# Patient Record
Sex: MALE | Race: OTHER | HISPANIC OR LATINO | URBAN - METROPOLITAN AREA
[De-identification: names, ages, dates, MRNs, and addresses within clinical notes are randomized per-mention and may not be internally consistent; named-entity substitution may affect disease eponyms.]

---

## 2022-10-12 ENCOUNTER — INPATIENT (INPATIENT)
Facility: HOSPITAL | Age: 22
LOS: 4 days | Discharge: HOME | End: 2022-10-17
Attending: PSYCHIATRY & NEUROLOGY | Admitting: PSYCHIATRY & NEUROLOGY
Payer: COMMERCIAL

## 2022-10-12 VITALS
DIASTOLIC BLOOD PRESSURE: 57 MMHG | RESPIRATION RATE: 18 BRPM | HEART RATE: 75 BPM | OXYGEN SATURATION: 100 % | WEIGHT: 199.96 LBS | TEMPERATURE: 98 F | SYSTOLIC BLOOD PRESSURE: 137 MMHG

## 2022-10-12 DIAGNOSIS — F32.A DEPRESSION, UNSPECIFIED: ICD-10-CM

## 2022-10-12 LAB
AMPHET UR-MCNC: SIGNIFICANT CHANGE UP
ANION GAP SERPL CALC-SCNC: 9 MMOL/L — SIGNIFICANT CHANGE UP (ref 7–14)
APAP SERPL-MCNC: <5 UG/ML — LOW (ref 10–30)
APPEARANCE UR: CLEAR — SIGNIFICANT CHANGE UP
BARBITURATES UR SCN-MCNC: SIGNIFICANT CHANGE UP
BASOPHILS # BLD AUTO: 0.05 K/UL — SIGNIFICANT CHANGE UP (ref 0–0.2)
BASOPHILS NFR BLD AUTO: 0.6 % — SIGNIFICANT CHANGE UP (ref 0–1)
BENZODIAZ UR-MCNC: SIGNIFICANT CHANGE UP
BILIRUB UR-MCNC: NEGATIVE — SIGNIFICANT CHANGE UP
BUN SERPL-MCNC: 13 MG/DL — SIGNIFICANT CHANGE UP (ref 10–20)
CALCIUM SERPL-MCNC: 9.5 MG/DL — SIGNIFICANT CHANGE UP (ref 8.4–10.5)
CHLORIDE SERPL-SCNC: 103 MMOL/L — SIGNIFICANT CHANGE UP (ref 98–110)
CO2 SERPL-SCNC: 25 MMOL/L — SIGNIFICANT CHANGE UP (ref 17–32)
COCAINE METAB.OTHER UR-MCNC: SIGNIFICANT CHANGE UP
COLOR SPEC: YELLOW — SIGNIFICANT CHANGE UP
CREAT SERPL-MCNC: 0.8 MG/DL — SIGNIFICANT CHANGE UP (ref 0.7–1.5)
DIFF PNL FLD: NEGATIVE — SIGNIFICANT CHANGE UP
DRUG SCREEN 1, URINE RESULT: SIGNIFICANT CHANGE UP
EGFR: 130 ML/MIN/1.73M2 — SIGNIFICANT CHANGE UP
EOSINOPHIL # BLD AUTO: 0.1 K/UL — SIGNIFICANT CHANGE UP (ref 0–0.7)
EOSINOPHIL NFR BLD AUTO: 1.2 % — SIGNIFICANT CHANGE UP (ref 0–8)
ETHANOL SERPL-MCNC: <10 MG/DL — SIGNIFICANT CHANGE UP
FENTANYL UR QL: SIGNIFICANT CHANGE UP
GLUCOSE SERPL-MCNC: 112 MG/DL — HIGH (ref 70–99)
GLUCOSE UR QL: NEGATIVE MG/DL — SIGNIFICANT CHANGE UP
HCT VFR BLD CALC: 43.4 % — SIGNIFICANT CHANGE UP (ref 42–52)
HGB BLD-MCNC: 14.4 G/DL — SIGNIFICANT CHANGE UP (ref 14–18)
IMM GRANULOCYTES NFR BLD AUTO: 0.2 % — SIGNIFICANT CHANGE UP (ref 0.1–0.3)
KETONES UR-MCNC: NEGATIVE — SIGNIFICANT CHANGE UP
LEUKOCYTE ESTERASE UR-ACNC: NEGATIVE — SIGNIFICANT CHANGE UP
LYMPHOCYTES # BLD AUTO: 3.14 K/UL — SIGNIFICANT CHANGE UP (ref 1.2–3.4)
LYMPHOCYTES # BLD AUTO: 38.8 % — SIGNIFICANT CHANGE UP (ref 20.5–51.1)
MCHC RBC-ENTMCNC: 25.7 PG — LOW (ref 27–31)
MCHC RBC-ENTMCNC: 33.2 G/DL — SIGNIFICANT CHANGE UP (ref 32–37)
MCV RBC AUTO: 77.5 FL — LOW (ref 80–94)
METHADONE UR-MCNC: SIGNIFICANT CHANGE UP
MONOCYTES # BLD AUTO: 0.49 K/UL — SIGNIFICANT CHANGE UP (ref 0.1–0.6)
MONOCYTES NFR BLD AUTO: 6 % — SIGNIFICANT CHANGE UP (ref 1.7–9.3)
NEUTROPHILS # BLD AUTO: 4.3 K/UL — SIGNIFICANT CHANGE UP (ref 1.4–6.5)
NEUTROPHILS NFR BLD AUTO: 53.2 % — SIGNIFICANT CHANGE UP (ref 42.2–75.2)
NITRITE UR-MCNC: NEGATIVE — SIGNIFICANT CHANGE UP
NRBC # BLD: 0 /100 WBCS — SIGNIFICANT CHANGE UP (ref 0–0)
OPIATES UR-MCNC: SIGNIFICANT CHANGE UP
OXYCODONE UR-MCNC: SIGNIFICANT CHANGE UP
PCP UR-MCNC: SIGNIFICANT CHANGE UP
PH UR: 7.5 — SIGNIFICANT CHANGE UP (ref 5–8)
PLATELET # BLD AUTO: 273 K/UL — SIGNIFICANT CHANGE UP (ref 130–400)
POTASSIUM SERPL-MCNC: 4 MMOL/L — SIGNIFICANT CHANGE UP (ref 3.5–5)
POTASSIUM SERPL-SCNC: 4 MMOL/L — SIGNIFICANT CHANGE UP (ref 3.5–5)
PROPOXYPHENE QUALITATIVE URINE RESULT: SIGNIFICANT CHANGE UP
PROT UR-MCNC: NEGATIVE MG/DL — SIGNIFICANT CHANGE UP
RBC # BLD: 5.6 M/UL — SIGNIFICANT CHANGE UP (ref 4.7–6.1)
RBC # FLD: 12.7 % — SIGNIFICANT CHANGE UP (ref 11.5–14.5)
SALICYLATES SERPL-MCNC: <0.3 MG/DL — LOW (ref 4–30)
SARS-COV-2 RNA SPEC QL NAA+PROBE: SIGNIFICANT CHANGE UP
SODIUM SERPL-SCNC: 137 MMOL/L — SIGNIFICANT CHANGE UP (ref 135–146)
SP GR SPEC: 1.01 — SIGNIFICANT CHANGE UP (ref 1.01–1.03)
THC UR QL: SIGNIFICANT CHANGE UP
UROBILINOGEN FLD QL: 0.2 MG/DL — SIGNIFICANT CHANGE UP
WBC # BLD: 8.1 K/UL — SIGNIFICANT CHANGE UP (ref 4.8–10.8)
WBC # FLD AUTO: 8.1 K/UL — SIGNIFICANT CHANGE UP (ref 4.8–10.8)

## 2022-10-12 PROCEDURE — 93010 ELECTROCARDIOGRAM REPORT: CPT

## 2022-10-12 PROCEDURE — 99285 EMERGENCY DEPT VISIT HI MDM: CPT

## 2022-10-12 PROCEDURE — 90792 PSYCH DIAG EVAL W/MED SRVCS: CPT | Mod: 95

## 2022-10-12 NOTE — ED ADULT TRIAGE NOTE - CHIEF COMPLAINT QUOTE
pt states he has been feeling suicidal for "quite some time' states he has no active plan of suicide, 1-1 initiated.

## 2022-10-12 NOTE — ED PROVIDER NOTE - CLINICAL SUMMARY MEDICAL DECISION MAKING FREE TEXT BOX
Patient medically cleared for psych evaluation.  Patient seen by telemetry psychiatrist.  Patient to be admitted under voluntary status

## 2022-10-12 NOTE — ED PROVIDER NOTE - EKG ADDITIONAL QUESTION - PERFORMED INDEPENDENT VISUALIZATION
[No Acute Distress] : no acute distress [Well Nourished] : well nourished [Well Developed] : well developed [Well-Appearing] : well-appearing [PERRL] : pupils equal round and reactive to light [Normal Sclera/Conjunctiva] : normal sclera/conjunctiva [EOMI] : extraocular movements intact [No JVD] : no jugular venous distention Yes [No Lymphadenopathy] : no lymphadenopathy [Supple] : supple [Thyroid Normal, No Nodules] : the thyroid was normal and there were no nodules present [No Respiratory Distress] : no respiratory distress  [No Accessory Muscle Use] : no accessory muscle use [Clear to Auscultation] : lungs were clear to auscultation bilaterally [Normal Rate] : normal rate  [Regular Rhythm] : with a regular rhythm [Normal S1, S2] : normal S1 and S2 [No Murmur] : no murmur heard [No Carotid Bruits] : no carotid bruits [No Abdominal Bruit] : a ~M bruit was not heard ~T in the abdomen [No Varicosities] : no varicosities [Pedal Pulses Present] : the pedal pulses are present [No Edema] : there was no peripheral edema [No Palpable Aorta] : no palpable aorta [Soft] : abdomen soft [No Extremity Clubbing/Cyanosis] : no extremity clubbing/cyanosis [Non Tender] : non-tender [Non-distended] : non-distended [No HSM] : no HSM [No Masses] : no abdominal mass palpated [Normal Bowel Sounds] : normal bowel sounds [Normal Anterior Cervical Nodes] : no anterior cervical lymphadenopathy [No CVA Tenderness] : no CVA  tenderness [No Spinal Tenderness] : no spinal tenderness [No Joint Swelling] : no joint swelling [No Rash] : no rash [Coordination Grossly Intact] : coordination grossly intact [No Focal Deficits] : no focal deficits [Deep Tendon Reflexes (DTR)] : deep tendon reflexes were 2+ and symmetric [Normal Affect] : the affect was normal [Normal Insight/Judgement] : insight and judgment were intact

## 2022-10-12 NOTE — ED BEHAVIORAL HEALTH NOTE - BEHAVIORAL HEALTH NOTE
===================  PRE-HOSPITAL COURSE  ===================  SOURCE: ED RN and secondhand documentation   DETAILS:  Patient self-presented to ED c/o suicidal ideation, without plan.      ============  ED COURSE   ============  SOURCE: ED RN and secondhand documentation  BELONGINGS:  No belongings of note. All belongings were provided to hospital security, and patient currently in a gown with a 1:1 staff member.  BEHAVIOR: Patient is AAOx4, calm and cooperative, presenting with depressed mood. Patient endorsing SI without plan. Denies HI/AH/VH. Patient remains in good behavioral and impulse control, is not currently violent/aggressive. Patient well groomed.   TREATMENT: Patient did not require PRNs in ED.    VISITORS: None at bedside.

## 2022-10-12 NOTE — ED BEHAVIORAL HEALTH ASSESSMENT NOTE - SUMMARY
The patient is a 22-year-old male; domiciled with parents; high school student; PPHx of ADD, no prior admission, hx of NSSIB by cutting, denies hx of SA, hx of aggression; PMHx of scoliosis; BIBS accompanied by father; psychiatry consulted for SI.  Pt reports worsening depressive symptoms and SI recently.  He is not engaged in outpatient tx.  Pt would benefit from inpatient admission for safety and stabilization.
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
## 2022-10-12 NOTE — ED PROVIDER NOTE - OBJECTIVE STATEMENT
Patient is a 22-year-old male past medical history of depression presents for evaluation of worsening depressive symptoms happening over the past several weeks to months however noted the abrupt decline in the past several weeks to the point where he is having suicidal ideations stating came to kill himself no plan he denies any homicidal ideations denies any hallucinations or delusional behavior denies any headache visual changes chest pain shortness of breath abdominal pain back pain dysuria hesitancy or frequency denies any vomiting or diarrhea

## 2022-10-12 NOTE — ED BEHAVIORAL HEALTH ASSESSMENT NOTE - AFFECT RANGE
Vaccine Information Statement(s) was given today. This has been reviewed, questions answered, and verbal consent given by Parent for injection(s) and administration of Influenza (Inactivated).    Patient tolerated without incident. See immunization grid for documentation.     Constricted

## 2022-10-12 NOTE — ED PROVIDER NOTE - NS ED ROS FT
Review of Systems:  	•	CONSTITUTIONAL - no fever, no diaphoresis, no chills  	•	SKIN - no rash  	•	HEMATOLOGIC - no bleeding, no bruising  	•	EYES - no eye pain, no blurry vision  	•	ENT - no change in hearing, no sore throat, no ear pain or tinnitus  	•	RESPIRATORY - no shortness of breath, no cough  	•	CARDIAC - no chest pain, no palpitations  	•	GI - no abd pain, no nausea, no vomiting, no diarrhea, no constipation  	•	GENITO-URINARY - no discharge, no dysuria; no hematuria, no increased urinary frequency  	•	MUSCULOSKELETAL - no joint paint, no swelling, no redness  	•	NEUROLOGIC - no weakness, no headache, no paresthesias, no LOC  	•	PSYCH -  anxiety, non suicidal, non homicidal, no hallucination, depression
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
## 2022-10-12 NOTE — ED PROVIDER NOTE - NS ED ATTENDING STATEMENT MOD
This was a shared visit with the SARAY. I reviewed and verified the documentation and independently performed the documented:

## 2022-10-12 NOTE — ED PROVIDER NOTE - ATTENDING APP SHARED VISIT CONTRIBUTION OF CARE
I was present for and supervised the key and critical aspects of the procedures performed during the care of the patient. Patient is a 22-year-old male past medical history of depression presents for evaluation of worsening depressive symptoms happening over the past several weeks to months however noted the abrupt decline in the past several weeks to the point where he is having suicidal ideations stating came to kill himself no plan he denies any homicidal ideations denies any hallucinations or delusional behavior denies any headache visual changes chest pain shortness of breath abdominal pain back pain dysuria hesitancy or frequency denies any vomiting or diarrhea    On physical exam patient is well-appearing on cephalic atraumatic pupils equal round react light commendation extraocular muscles intact oropharynx clear chest clear to auscultation bilaterally abdomen soft nontender nondistended bowel sounds positive extremities full range of motion patient able to ambulate    Assessment plan patient presents for evaluation of worsening depression with suicidal ideations retained labs as well as EKG and will consult telepsychiatry for further evaluation

## 2022-10-12 NOTE — ED PROVIDER NOTE - PHYSICAL EXAMINATION
patient is well-appearing on cephalic atraumatic pupils equal round react light commendation extraocular muscles intact oropharynx clear chest clear to auscultation bilaterally abdomen soft nontender nondistended bowel sounds positive extremities full range of motion patient able to ambulate
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
## 2022-10-13 DIAGNOSIS — F32.1 MAJOR DEPRESSIVE DISORDER, SINGLE EPISODE, MODERATE: ICD-10-CM

## 2022-10-13 DIAGNOSIS — F12.10 CANNABIS ABUSE, UNCOMPLICATED: ICD-10-CM

## 2022-10-13 LAB
A1C WITH ESTIMATED AVERAGE GLUCOSE RESULT: 5.4 % — SIGNIFICANT CHANGE UP (ref 4–5.6)
CHOLEST SERPL-MCNC: 177 MG/DL — SIGNIFICANT CHANGE UP
ESTIMATED AVERAGE GLUCOSE: 108 MG/DL — SIGNIFICANT CHANGE UP (ref 68–114)
HDLC SERPL-MCNC: 46 MG/DL — SIGNIFICANT CHANGE UP
LIPID PNL WITH DIRECT LDL SERPL: 117 MG/DL — HIGH
NON HDL CHOLESTEROL: 131 MG/DL — HIGH
TRIGL SERPL-MCNC: 69 MG/DL — SIGNIFICANT CHANGE UP
TSH SERPL-MCNC: 2.51 UIU/ML — SIGNIFICANT CHANGE UP (ref 0.27–4.2)

## 2022-10-13 PROCEDURE — 99232 SBSQ HOSP IP/OBS MODERATE 35: CPT

## 2022-10-13 RX ORDER — INFLUENZA VIRUS VACCINE 15; 15; 15; 15 UG/.5ML; UG/.5ML; UG/.5ML; UG/.5ML
0.5 SUSPENSION INTRAMUSCULAR ONCE
Refills: 0 | Status: DISCONTINUED | OUTPATIENT
Start: 2022-10-13 | End: 2022-10-17

## 2022-10-13 RX ORDER — DIPHENHYDRAMINE HCL 50 MG
25 CAPSULE ORAL AT BEDTIME
Refills: 0 | Status: DISCONTINUED | OUTPATIENT
Start: 2022-10-13 | End: 2022-10-13

## 2022-10-13 RX ORDER — HYDROXYZINE HCL 10 MG
50 TABLET ORAL EVERY 6 HOURS
Refills: 0 | Status: DISCONTINUED | OUTPATIENT
Start: 2022-10-13 | End: 2022-10-17

## 2022-10-13 RX ORDER — ESCITALOPRAM OXALATE 10 MG/1
10 TABLET, FILM COATED ORAL DAILY
Refills: 0 | Status: DISCONTINUED | OUTPATIENT
Start: 2022-10-13 | End: 2022-10-17

## 2022-10-13 RX ORDER — NICOTINE POLACRILEX 2 MG
2 GUM BUCCAL
Refills: 0 | Status: DISCONTINUED | OUTPATIENT
Start: 2022-10-13 | End: 2022-10-17

## 2022-10-13 RX ORDER — NICOTINE POLACRILEX 2 MG
1 GUM BUCCAL DAILY
Refills: 0 | Status: DISCONTINUED | OUTPATIENT
Start: 2022-10-13 | End: 2022-10-13

## 2022-10-13 RX ORDER — ACETAMINOPHEN 500 MG
650 TABLET ORAL EVERY 6 HOURS
Refills: 0 | Status: DISCONTINUED | OUTPATIENT
Start: 2022-10-13 | End: 2022-10-17

## 2022-10-13 RX ORDER — HALOPERIDOL DECANOATE 100 MG/ML
5 INJECTION INTRAMUSCULAR EVERY 8 HOURS
Refills: 0 | Status: DISCONTINUED | OUTPATIENT
Start: 2022-10-13 | End: 2022-10-17

## 2022-10-13 RX ORDER — NICOTINE POLACRILEX 2 MG
1 GUM BUCCAL DAILY
Refills: 0 | Status: DISCONTINUED | OUTPATIENT
Start: 2022-10-13 | End: 2022-10-17

## 2022-10-13 RX ADMIN — ESCITALOPRAM OXALATE 10 MILLIGRAM(S): 10 TABLET, FILM COATED ORAL at 14:29
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
## 2022-10-13 NOTE — PATIENT PROFILE BEHAVIORAL HEALTH - FALL HARM RISK - UNIVERSAL INTERVENTIONS
Bed in lowest position, wheels locked, appropriate side rails in place/Call bell, personal items and telephone in reach/Instruct patient to call for assistance before getting out of bed or chair/Non-slip footwear when patient is out of bed/[place] to call system/Physically safe environment - no spills, clutter or unnecessary equipment/Purposeful Proactive Rounding/Room/bathroom lighting operational, light cord in reach

## 2022-10-13 NOTE — PROGRESS NOTE BEHAVIORAL HEALTH - NSBHFUPSTRENGTHS_PSY_A_CORE
Motivated and ready for change/Has supportive interpersonal relationships with family, friends or peers

## 2022-10-13 NOTE — PROGRESS NOTE BEHAVIORAL HEALTH - NSBHADDHXSUBSTFT_PSY_A_CORE
Pt states he smokes weed and has transitioned from smoking cigarettes to using vapes. Pt states from 6/22-10/22, he used acid, which caused him to be anxious and hallucinate. Denies current use of illicit drugs. Pt endorses daily cannabis use, unable to quantify; nicotine use via vaping. Denied alcohol use. Denied current use of other illicit substances. Reports h/o acid use last year from 6/22-10/22.

## 2022-10-13 NOTE — PROGRESS NOTE BEHAVIORAL HEALTH - NSBHADMITMEDEDUDETAILS_A_CORE FT
educate don risk and benefits of medication, and side effect profile verbalized understanding educated on risk and benefits of Lexapro, and side effect profile, verbalized understanding Educated on risks and benefits of Lexapro, and side effects profile. Pt verbalized understanding
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
## 2022-10-13 NOTE — H&P ADULT - NSHPLABSRESULTS_GEN_ALL_CORE
14.4   8.10  )-----------( 273      ( 12 Oct 2022 11:03 )             43.4     10-12    137  |  103  |  13  ----------------------------<  112<H>  4.0   |  25  |  0.8    Ca    9.5      12 Oct 2022 11:03            Urinalysis Basic - ( 12 Oct 2022 13:15 )    Color: Yellow / Appearance: Clear / S.015 / pH: x  Gluc: x / Ketone: Negative  / Bili: Negative / Urobili: 0.2 mg/dL   Blood: x / Protein: Negative mg/dL / Nitrite: Negative   Leuk Esterase: Negative / RBC: x / WBC x   Sq Epi: x / Non Sq Epi: x / Bacteria: x

## 2022-10-13 NOTE — PROGRESS NOTE BEHAVIORAL HEALTH - SUMMARY
# 22-year-old male past medical history of depression presents for evaluation of worsening depressive symptoms happening over the past several weeks to months however noted the abrupt decline in the past several weeks to the point where he is having suicidal ideations but no plan. Denies any homicidal ideations denies any hallucinations or delusional behavior denies any headache visual changes chest pain shortness of breath abdominal pain back pain dysuria hesitancy or frequency denies any vomiting or diarrhea. At the time of evaluation, pt denies SI.    On evaluation today, pt presents dysphoric in room. He reports his "suicidal thoughts" is what led him to the hospital. Pt is isolative to room and within behavioral control. Groups/DBT encouraged.   # 22-year-old male past medical history of depression presents for evaluation of worsening depressive symptoms happening over the past several weeks to months however noted the abrupt decline in the past several weeks to the point where he is having suicidal ideations but no plan. Denies any homicidal ideations denies any hallucinations or delusional behavior denies any headache visual changes chest pain shortness of breath abdominal pain back pain dysuria hesitancy or frequency denies any vomiting or diarrhea. At the time of evaluation, pt denies SI.    On evaluation today, pt presents dysphoric in room. He reports his "suicidal thoughts" is what led him to the hospital. Pt is isolative to room and within behavioral control. Groups/DBT encouraged. Pt in agreement of starting Lexapro to help with mood.   #MDD  - start Lexapro 10mg PO 1x daily qac --> (10/13) 22-year-old male past medical history of depression presents for evaluation of worsening depressive symptoms happening over the past several weeks to months however noted the abrupt decline in the past several weeks to the point where he is having suicidal ideations but no plan. Denies any homicidal ideations denies any hallucinations or delusional behavior denies any headache visual changes chest pain shortness of breath abdominal pain back pain dysuria hesitancy or frequency denies any vomiting or diarrhea. At the time of evaluation, pt denies SI.    On evaluation today, pt presents dysphoric in room. He reports his "suicidal thoughts" is what led him to the hospital. Pt is isolative to room and within behavioral control. Groups/DBT encouraged. Pt in agreement of starting Lexapro to help with mood.     #MDD  - start Lexapro 10mg PO daily (10/13)  -start hydroxyzine 25 mg q6h prn for anxiety/insomnia  -encourage groups/DBT    #Cannabis use d/o  -no treatment protocol indicated at this time    #Agitation  -for agitation not amenable to verbal redirection, may give haldol 5 mg q6h prn and ativan 2 mg q6h prn with escalation to IM if pt is a danger to self or/and others with repeat EKG to ensure QTc <500 ms 22-year-old male; domiciled with parents; high school student; PPHx of ?ADD, no prior admission, hx of NSSIB by cutting, denies hx of SA, hx of aggression; PMHx of scoliosis; BIBS accompanied by father; psychiatry consulted for SI. Pt reports worsening depressive symptoms and SI in setting of multiple psychosocial stressors. His presentation appears most consistent with MDD at this time with underlying cannabis use.     #MDD  -start Lexapro 10mg PO daily (10/13)  -start hydroxyzine 25 mg q6h prn for anxiety/insomnia  -encourage groups/DBT    #Cannabis use d/o  -no treatment protocol indicated at this time    #Agitation  -for agitation not amenable to verbal redirection, may give haldol 5 mg q6h prn and ativan 2 mg q6h prn with escalation to IM if pt is a danger to self or/and others with repeat EKG to ensure QTc <500 ms

## 2022-10-13 NOTE — PROGRESS NOTE BEHAVIORAL HEALTH - NSBHCHARTREVIEWLAB_PSY_A_CORE FT
Cholesterol, Serum: 177 mg/dL (10.13.22 @ 08:59)Triglycerides, Serum: 69 mg/dL (10.13.22 @ 08:59) HDL Cholesterol, Serum: 46 mg/dL (10.13.22 @ 08:59) Non HDL Cholesterol: 13LDL Cholesterol Calculated: 117 mg/dL (10.13.22 @ 08:59) Complete Blood Count + Automated Diff (10.12.22 @ 11:03)   WBC Count: 8.10 K/uL   RBC Count: 5.60 M/uL   Hemoglobin: 14.4 g/dL   Hematocrit: 43.4 %   Mean Cell Volume: 77.5 fL   Mean Cell Hemoglobin: 25.7 pg   Mean Cell Hemoglobin Conc: 33.2 g/dL   Red Cell Distrib Width: 12.7 %   Platelet Count - Automated: 273 K/uL   Auto Neutrophil #: 4.30 K/uL   Auto Lymphocyte #: 3.14 K/uL   Auto Monocyte #: 0.49 K/uL   Auto Eosinophil #: 0.10 K/uL   Auto Basophil #: 0.05 K/uL   Auto Neutrophil %: 53.2  Auto Lymphocyte %: 38.8 %   Auto Monocyte %: 6.0 %   Auto Eosinophil %: 1.2 %   Auto Basophil %: 0.6 %   Auto Immature Granulocyte %: 0.2: (Includes meta, myelo and promyelocytes). Mild elevations in immature   granulocytes may be seen with many inflammatory processes and pregnancy;   clinical correlation suggested. %   Nucleated RBC: 0 /100 WBCs   Basic Metabolic Panel (10.12.22 @ 11:03)   Sodium, Serum: 137 mmol/L   Potassium, Serum: 4.0 mmol/L   Chloride, Serum: 103 mmol/L   Carbon Dioxide, Serum: 25 mmol/L   Anion Gap, Serum: 9 mmol/L   Blood Urea Nitrogen, Serum: 13 mg/dL   Creatinine, Serum: 0.8 mg/dL   Glucose, Serum: 112 mg/dL   Calcium, Total Serum: 9.5 mg/dL   eGFR: 130  Lipid Profile (10.13.22 @ 08:59)   Cholesterol, Serum: 177 mg/dL   Triglycerides, Serum: 69 mg/dL   HDL Cholesterol, Serum: 46 mg/dL   Non HDL Cholesterol: 131  LDL Cholesterol Calculated: 117 mg/dL
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
## 2022-10-13 NOTE — H&P ADULT - HISTORY OF PRESENT ILLNESS
22-year-old male past medical history of depression presents for evaluation of worsening depressive symptoms happening over the past several weeks to months however noted the abrupt decline in the past several weeks to the point where he is having suicidal ideations stating came to kill himself no plan he denies any homicidal ideations denies any hallucinations or delusional behavior denies any headache visual changes chest pain shortness of breath abdominal pain back pain dysuria hesitancy or frequency denies any vomiting or diarrhea 22-year-old male past medical history of depression presents for evaluation of worsening depressive symptoms happening over the past several weeks to months however noted the abrupt decline in the past several weeks to the point where he is having suicidal ideations but no plan. Denies any homicidal ideations denies any hallucinations or delusional behavior denies any headache visual changes chest pain shortness of breath abdominal pain back pain dysuria hesitancy or frequency denies any vomiting or diarrhea. At the time of evaluation, pt denies SI.

## 2022-10-13 NOTE — H&P ADULT - NSHPPHYSICALEXAM_GEN_ALL_CORE
Vital Signs Last 24 Hrs  T(C): 36.9 (13 Oct 2022 02:19), Max: 36.9 (13 Oct 2022 02:19)  T(F): 98.4 (13 Oct 2022 02:19), Max: 98.4 (13 Oct 2022 02:19)  HR: 71 (13 Oct 2022 02:19) (71 - 75)  BP: 134/80 (13 Oct 2022 02:19) (134/80 - 137/57)  BP(mean): --  RR: 20 (13 Oct 2022 02:19) (18 - 20)  SpO2: 100% (13 Oct 2022 02:19) (100% - 100%)    Parameters below as of 12 Oct 2022 10:53  Patient On (Oxygen Delivery Method): room air        Constitutional: NAD, A&O x3    Eyes: PERRLA, no conjuctivitis    Neck: no lymphadenopathy    Respiratory: +air entry, no rales, no rhonchi, no wheezes    Cardiovascular: +S1 and S2, regular rate and rhythm    Gastrointestinal: +BS, soft, non-tender, not distended    Extremities:  no edema, no calf tenderness    Neurological: sensation intact, ROM equal B/L, CN II-XII intact    Skin: no rashes, normal turgor

## 2022-10-13 NOTE — H&P ADULT - ASSESSMENT
22-year-old male past medical history of depression presents for evaluation of worsening depressive symptoms happening over the past several weeks to months however noted the abrupt decline in the past several weeks to the point where he is having suicidal ideations but no plan. Denies any homicidal ideations denies any hallucinations or delusional behavior denies any headache visual changes chest pain shortness of breath abdominal pain back pain dysuria hesitancy or frequency denies any vomiting or diarrhea. At the time of evaluation, pt denies SI.     A/P:  -Admit to IPP  -Psych evaluation and mgmt

## 2022-10-14 LAB
ALBUMIN SERPL ELPH-MCNC: 5.4 G/DL — HIGH (ref 3.5–5.2)
ALP SERPL-CCNC: 86 U/L — SIGNIFICANT CHANGE UP (ref 30–115)
ALT FLD-CCNC: 22 U/L — SIGNIFICANT CHANGE UP (ref 13–38)
AST SERPL-CCNC: 17 U/L — SIGNIFICANT CHANGE UP (ref 13–38)
BILIRUB DIRECT SERPL-MCNC: <0.2 MG/DL — SIGNIFICANT CHANGE UP (ref 0–0.3)
BILIRUB INDIRECT FLD-MCNC: >0.7 MG/DL — SIGNIFICANT CHANGE UP (ref 0.2–1.2)
BILIRUB SERPL-MCNC: 0.9 MG/DL — SIGNIFICANT CHANGE UP (ref 0.2–1.2)
COVID-19 SPIKE DOMAIN AB INTERP: POSITIVE
COVID-19 SPIKE DOMAIN ANTIBODY RESULT: >250 U/ML — HIGH
PROT SERPL-MCNC: 8.4 G/DL — HIGH (ref 6–8)
SARS-COV-2 IGG+IGM SERPL QL IA: >250 U/ML — HIGH
SARS-COV-2 IGG+IGM SERPL QL IA: POSITIVE

## 2022-10-14 PROCEDURE — 99231 SBSQ HOSP IP/OBS SF/LOW 25: CPT

## 2022-10-14 RX ORDER — TRAZODONE HCL 50 MG
50 TABLET ORAL AT BEDTIME
Refills: 0 | Status: DISCONTINUED | OUTPATIENT
Start: 2022-10-14 | End: 2022-10-17

## 2022-10-14 RX ADMIN — Medication 50 MILLIGRAM(S): at 22:15

## 2022-10-14 RX ADMIN — Medication 1 PATCH: at 19:24

## 2022-10-14 RX ADMIN — ESCITALOPRAM OXALATE 10 MILLIGRAM(S): 10 TABLET, FILM COATED ORAL at 08:00

## 2022-10-14 RX ADMIN — Medication 1 PATCH: at 08:00

## 2022-10-14 RX ADMIN — Medication 2 MILLIGRAM(S): at 16:08

## 2022-10-14 NOTE — PROGRESS NOTE BEHAVIORAL HEALTH - SUMMARY
22-year-old male; domiciled with parents; high school student; PPHx of ?ADD, no prior admission, hx of NSSIB by cutting, denies hx of SA, hx of aggression; PMHx of scoliosis; BIBS accompanied by father; psychiatry consulted for SI. Pt reports worsening depressive symptoms and SI in setting of multiple psychosocial stressors. His presentation appears most consistent with MDD at this time with underlying cannabis use.     #MDD  -c/w Lexapro 10mg PO daily (10/13)  -start traozone 50 mg qhs prn for insomnia (10/14)  -c/w hydroxyzine 25 mg q6h prn for anxiety/insomnia  -encourage groups/DBT    #Cannabis use d/o  -no treatment protocol indicated at this time    #Agitation  -for agitation not amenable to verbal redirection, may give haldol 5 mg q6h prn and ativan 2 mg q6h prn with escalation to IM if pt is a danger to self or/and others with repeat EKG to ensure QTc <500 ms 22-year-old male; domiciled with parents; high school student; PPHx of ?ADD, no prior admission, hx of NSSIB by cutting, denies hx of SA, hx of aggression; PMHx of scoliosis; BIBS accompanied by father; psychiatry consulted for SI. Pt reports worsening depressive symptoms and SI in setting of multiple psychosocial stressors. His presentation appears most consistent with MDD at this time with underlying cannabis use.     #MDD  -c/w lexapro 10 mg daily  -start trazodone 50 mg qhs prn for insomnia (10/14)  -c/w hydroxyzine 25 mg q6h prn for anxiety/insomnia  -encourage groups/DBT    #Cannabis use d/o  -no treatment protocol indicated at this time    #Agitation  -for agitation not amenable to verbal redirection, may give haldol 5 mg q6h prn and ativan 2 mg q6h prn with escalation to IM if pt is a danger to self or/and others with repeat EKG to ensure QTc <500 ms
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
## 2022-10-14 NOTE — DISCHARGE NOTE BEHAVIORAL HEALTH - NSBHDCSUBSTHXFT_PSY_A_CORE
As noted above. As noted above.  Pt endorses daily cannabis use, unable to quantify; nicotine use via vaping. Denied alcohol use. Denied current use of other illicit substances. Reports h/o acid use last year from 6/22-10/22.

## 2022-10-14 NOTE — DISCHARGE NOTE BEHAVIORAL HEALTH - NS MD DC FALL RISK RISK
For information on Fall & Injury Prevention, visit: [email or web address] OR  [email or web address] OR  https://www.cdc.gov/steadi/patient.html

## 2022-10-14 NOTE — PROGRESS NOTE BEHAVIORAL HEALTH - NSBHFUPINTERVALHXFT_PSY_A_CORE
Pt seen and evaluated, chart reviewed. As per nursing report, pt wrote 72h letter requesting for discharge yesterday, retracted letter today. On evaluation, pt states he feels "a little better". He reports he is now more grateful of doing things he enjoyed when he was home. He reports looking forward to going home to take walks and listen to music. He reports he was unable to sleep last night, endorses difficulty falling asleep. He states he does not feel "comfortable" on unit, c/o feeling "secluded". He denies AVH, paranoia. Denies SI/HI, intent and plan. He presents future-oriented, states he spoke with his family and they are going to help him move in with his girlfriend. Pt isolative to room, encouraged groups/DBT. Pt adherent to medications, denies negative side effects.

## 2022-10-14 NOTE — DISCHARGE NOTE BEHAVIORAL HEALTH - MEDICATION SUMMARY - MEDICATIONS TO TAKE
I will START or STAY ON the medications listed below when I get home from the hospital:    escitalopram 10 mg oral tablet  -- 1 tab(s) by mouth once a day x 14 days Continue to take as prescribed until told otherwise by your provider  -- Indication: For Mood    traZODone 50 mg oral tablet  -- 1 tab(s) by mouth once a day (at bedtime) x 14 days, As Needed -insomnia Continue to take as prescribed until told otherwise by your provider   -- Indication: For Insomnia

## 2022-10-14 NOTE — DISCHARGE NOTE BEHAVIORAL HEALTH - NSBHDCRESPONSEFT_PSY_A_CORE
Pt has made significant progress over the course of hospitalization. With continuous psychotherapy from the treatment team and the medications, patient reports feeling better, sleeping and eating well. Thought process and insight improved. Pt verbalizes negative effects of illicit substances on mental health and verbalizes motivation to maintain sobriety. Pt was calm and cooperative and was in good behavioral control. Patient denied any suicidal or homicidal ideations. Patient denied any auditory or visual hallucinations. Pt was evaluated by treatment team, pt is stable for discharge and patient shows no imminent danger to self, others or property at this time. Pt understands and agrees with treatment plan and following up with outpatient. Psychoeducation provided regarding diagnosis, medications, treatment and follow up. Risks, benefits, alternatives discussed, all questions and concerns addressed and answered.

## 2022-10-14 NOTE — DISCHARGE NOTE BEHAVIORAL HEALTH - NSBHDCMEDSFT_PSY_A_CORE
Started on lexapro 10 mg daily. Started on trazodone 50 mg qhs. Started on hydroxyzine 50 mg q6h prn for anxiety/insomnia. Pt tolerated medications well, denied negative side effects.

## 2022-10-14 NOTE — PROGRESS NOTE BEHAVIORAL HEALTH - NSBHCHARTREVIEWLAB_PSY_A_CORE FT
Complete Blood Count + Automated Diff (10.12.22 @ 11:03)   WBC Count: 8.10 K/uL   RBC Count: 5.60 M/uL   Hemoglobin: 14.4 g/dL   Hematocrit: 43.4 %   Mean Cell Volume: 77.5 fL   Mean Cell Hemoglobin: 25.7 pg   Mean Cell Hemoglobin Conc: 33.2 g/dL   Red Cell Distrib Width: 12.7 %   Platelet Count - Automated: 273 K/uL   Auto Neutrophil #: 4.30 K/uL   Auto Lymphocyte #: 3.14 K/uL   Auto Monocyte #: 0.49 K/uL   Auto Eosinophil #: 0.10 K/uL   Auto Basophil #: 0.05 K/uL   Auto Neutrophil %: 53.2  Auto Lymphocyte %: 38.8 %   Auto Monocyte %: 6.0 %   Auto Eosinophil %: 1.2 %   Auto Basophil %: 0.6 %   Auto Immature Granulocyte %: 0.2: (Includes meta, myelo and promyelocytes). Mild elevations in immature   granulocytes may be seen with many inflammatory processes and pregnancy;   clinical correlation suggested. %   Nucleated RBC: 0 /100 WBCs   Basic Metabolic Panel (10.12.22 @ 11:03)   Sodium, Serum: 137 mmol/L   Potassium, Serum: 4.0 mmol/L   Chloride, Serum: 103 mmol/L   Carbon Dioxide, Serum: 25 mmol/L   Anion Gap, Serum: 9 mmol/L   Blood Urea Nitrogen, Serum: 13 mg/dL   Creatinine, Serum: 0.8 mg/dL   Glucose, Serum: 112 mg/dL   Calcium, Total Serum: 9.5 mg/dL   eGFR: 130  Lipid Profile (10.13.22 @ 08:59)   Cholesterol, Serum: 177 mg/dL   Triglycerides, Serum: 69 mg/dL   HDL Cholesterol, Serum: 46 mg/dL   Non HDL Cholesterol: 131  LDL Cholesterol Calculated: 117 mg/dL

## 2022-10-14 NOTE — DISCHARGE NOTE BEHAVIORAL HEALTH - NSBHDCSUICFCTRMIT_PSY_A_CORE
Patient can rely on his family and girlfriend for social support. Patient has been medication and treatment adherent. Patient is future oriented and agreeable to OP f/u. Patient verbalizing reasons for living. Patient with improved insight into events that lead up to hospitalization. He verbalizes motivation to maintain sobriety. Patient to use positive coping skills learned during the course of the inpatient hospitalization, eg STOP skill. Patient verbalized willingness to seek care in moment of crisis. Patient is agreeable that should he have any thoughts of hurting himself or others, he will call 911, return to the ED.

## 2022-10-14 NOTE — DISCHARGE NOTE BEHAVIORAL HEALTH - NSBHDCSUICFCTROTHERFT_PSY_A_CORE
Patient and provider identified future stressors as being arguments with loves ones, nonadherence with medication/outpatient follow up, relapse with illicit substances, financial instability.

## 2022-10-14 NOTE — DISCHARGE NOTE BEHAVIORAL HEALTH - NSBHDCSUICPROTECTFT_PSY_A_CORE
Supportive family and girlfriend, medication and treatment adherence, future orientation, ability to state reasons for living, improved insight, sobriety, willingness to seek care in moment of crisis
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
## 2022-10-15 PROCEDURE — 99231 SBSQ HOSP IP/OBS SF/LOW 25: CPT

## 2022-10-15 RX ADMIN — Medication 1 PATCH: at 07:06

## 2022-10-15 RX ADMIN — Medication 1 PATCH: at 08:06

## 2022-10-15 RX ADMIN — Medication 50 MILLIGRAM(S): at 20:11

## 2022-10-15 RX ADMIN — Medication 1 PATCH: at 20:02

## 2022-10-15 RX ADMIN — ESCITALOPRAM OXALATE 10 MILLIGRAM(S): 10 TABLET, FILM COATED ORAL at 08:06

## 2022-10-15 NOTE — PROGRESS NOTE BEHAVIORAL HEALTH - SUMMARY
Mr Bauer is a 22 year old man with a history of ADHD  and Cannabis dependence , admitted to the psychiatry floor for suicidal ideations.  Patient seems to be doing better, less depressed  , no longer has suicidal ideations .    Depression   - continue Lexapro 10 mg P.O daily  - continue  trazodone 50 mg P.o bedtime     Cannabis use d/o  - continue support

## 2022-10-15 NOTE — PROGRESS NOTE BEHAVIORAL HEALTH - NSBHFUPINTERVALHXFT_PSY_A_CORE
Patient seen and interviewed .   He reports that he feels better, much less depressed and denies having suicidal thoughts , intent or plan. He reports that he has found his signing voice again because he has not smoked. he reports good sleep and appetite and continues to tolerate his medications with no side effects.   As per nursing staff , patient is in good control, no agitation.

## 2022-10-16 RX ADMIN — Medication 2 MILLIGRAM(S): at 06:08

## 2022-10-16 RX ADMIN — ESCITALOPRAM OXALATE 10 MILLIGRAM(S): 10 TABLET, FILM COATED ORAL at 08:24

## 2022-10-16 RX ADMIN — Medication 1 PATCH: at 08:26

## 2022-10-16 RX ADMIN — Medication 1 PATCH: at 19:19

## 2022-10-16 RX ADMIN — Medication 2 MILLIGRAM(S): at 20:39

## 2022-10-16 NOTE — PROGRESS NOTE BEHAVIORAL HEALTH - NSBHFUPINTERVALHXFT_PSY_A_CORE
Patient seen and interviewed at bedside.   He reports that he feels better, and staying on the unit has helped him, however he reports having cravings for marijuana and nicotine and requests nicotine replacement therapy. He reports good sleep overall, although last night he did not sleep because he was "bored".  He has had good appetite and continues to tolerate his medications with no side effects.   As per nursing staff , patient is in good control, no agitation.

## 2022-10-16 NOTE — PROGRESS NOTE BEHAVIORAL HEALTH - NSBHATTESTCOMMENTATTENDFT_PSY_A_CORE
Mr Bauer is a 22 year old man with a history of ADHD  and Cannabis dependence , admitted to the psychiatry floor for suicidal ideations.  Patient seems to be doing better, less depressed  , no longer has suicidal ideations .    Patient was interviewed along with resident physician. Case was discussed. I agree with the assessment and plan as stated above.

## 2022-10-16 NOTE — PROGRESS NOTE BEHAVIORAL HEALTH - NSBHCHARTREVIEWLAB_PSY_A_CORE FT
No new labs  Previous labs as follows:   =======================================================      Creatinine, Serum: 0.8 mg/dL (10-12-22 @ 11:03)      WBC Count: 8.10 K/uL (10-12-22 @ 11:03)    COVID-19 PCR: NotDetec (10-12-22 @ 11:03)      Alkaline Phosphatase, Serum: 86 U/L (10-14-22 @ 08:07)  Alanine Aminotransferase (ALT/SGPT): 22 U/L (10-14-22 @ 08:07)  Aspartate Aminotransferase (AST/SGOT): 17 U/L (10-14-22 @ 08:07)  Bilirubin Total, Serum: 0.9 mg/dL (10-14-22 @ 08:07)  Bilirubin Direct, Serum: <0.2 mg/dL (10-14-22 @ 08:07)

## 2022-10-17 VITALS
DIASTOLIC BLOOD PRESSURE: 80 MMHG | RESPIRATION RATE: 16 BRPM | SYSTOLIC BLOOD PRESSURE: 142 MMHG | TEMPERATURE: 98 F | HEART RATE: 91 BPM

## 2022-10-17 PROCEDURE — 99238 HOSP IP/OBS DSCHRG MGMT 30/<: CPT

## 2022-10-17 RX ORDER — ESCITALOPRAM OXALATE 10 MG/1
1 TABLET, FILM COATED ORAL
Qty: 14 | Refills: 0
Start: 2022-10-17 | End: 2022-10-30

## 2022-10-17 RX ORDER — TRAZODONE HCL 50 MG
1 TABLET ORAL
Qty: 14 | Refills: 0
Start: 2022-10-17 | End: 2022-10-30

## 2022-10-17 RX ADMIN — Medication 1 PATCH: at 06:59

## 2022-10-17 RX ADMIN — ESCITALOPRAM OXALATE 10 MILLIGRAM(S): 10 TABLET, FILM COATED ORAL at 08:05

## 2022-10-17 RX ADMIN — Medication 2 MILLIGRAM(S): at 05:59

## 2022-10-17 NOTE — BH SAFETY PLAN - ASK FOR HELP PHONE 2
Brought wheelchair to the room to discharge patient. Removed IV from patient wrist.  Spoke with  Ina Calderon, his lyft is scheduled for 12:30 . Pt was refusing to discharge because he was very upset that hes being discharged without any medications for withdraw to last him until he can get to the [facility] house on Monday because there is not an open bed today. This nurse explained the discharge plan to patient     Pt demanding to speak with the \"risk doctor\" because he wants to file a grievance with the hospital because he is not properly being cared for. He repeatedly said: Veronicafred Tierney got to cover myself\"  Pt asked writer: \"whats your badge number? \" and I told him \"Nurses do not have badge numbers, we're not police officers\"     Followed chain of command and called charge nurse, and , farhan, social work and let Dr. Promise Stinson, know. Once charge nurses entered the room, patient agreed to leave walking towards the main elevators, security escorting patient downstairs to main lobby.      Electronically signed by Kadeem Zabala RN on 6/4/2021 at 1:18 PM In my phone

## 2022-10-17 NOTE — PROGRESS NOTE BEHAVIORAL HEALTH - NSBHATTESTTYPEVISIT_PSY_A_CORE
Attending Only
SARAY without on-site Attending supervision
SARAY without on-site Attending supervision
Attending with Resident/Fellow/Student
SARAY without on-site Attending supervision

## 2022-10-17 NOTE — BH SAFETY PLAN - ENVIRONMENT SAFETY 1:
I can make sure my environment is safe by removing any triggers or anything that can make me feel bad.

## 2022-10-17 NOTE — PROGRESS NOTE BEHAVIORAL HEALTH - NSBHFUPINTERVALCCFT_PSY_A_CORE
"I'm good"
" I'm really doing better "
"I feel fine"
"I couldn't sleep"
"I've been having suicidal thoughts"

## 2022-10-17 NOTE — PROGRESS NOTE BEHAVIORAL HEALTH - NSBHCHARTREVIEWINVESTIGATE_PSY_A_CORE FT
< from: 12 Lead ECG (10.12.22 @ 11:13) >    Ventricular Rate 67 BPM    Atrial Rate 67 BPM    P-R Interval 156 ms    QRS Duration 102 ms    Q-T Interval 378 ms    QTC Calculation(Bazett) 399 ms    P Axis 35 degrees    R Axis 40 degrees    T Axis 17 degrees    Diagnosis Line Normal sinus rhythm  Nonspecific T wave abnormality  Abnormal ECG    Confirmed by WILLY STEPHENS, MORRO ([id number]) on 10/12/2022 12:22:11 PM    < end of copied text >
< from: 12 Lead ECG (10.12.22 @ 11:13) >    Ventricular Rate 67 BPM    Atrial Rate 67 BPM    P-R Interval 156 ms    QRS Duration 102 ms    Q-T Interval 378 ms    QTC Calculation(Bazett) 399 ms    P Axis 35 degrees    R Axis 40 degrees    T Axis 17 degrees    Diagnosis Line Normal sinus rhythm  Nonspecific T wave abnormality  Abnormal ECG    Confirmed by WILLY STEPHENS, MORRO ([id number]) on 10/12/2022 12:22:11 PM    < end of copied text >
Name band;
EKG from 10/12      Ventricular Rate 67 BPM    Atrial Rate 67 BPM    P-R Interval 156 ms    QRS Duration 102 ms    Q-T Interval 378 ms    QTC Calculation(Bazett) 399 ms    P Axis 35 degrees    R Axis 40 degrees    T Axis 17 degrees    Diagnosis Line Normal sinus rhythm  Nonspecific T wave abnormality  Abnormal ECG    Confirmed by MORRO AGULIERA MD ([id number]) on 10/12/2022 12:22:11 PM
< from: 12 Lead ECG (10.12.22 @ 11:13) >    Ventricular Rate 67 BPM    Atrial Rate 67 BPM    P-R Interval 156 ms    QRS Duration 102 ms    Q-T Interval 378 ms    QTC Calculation(Bazett) 399 ms    P Axis 35 degrees    R Axis 40 degrees    T Axis 17 degrees    Diagnosis Line Normal sinus rhythm  Nonspecific T wave abnormality  Abnormal ECG    Confirmed by WILLY STEPHENS, MORRO ([id number]) on 10/12/2022 12:22:11 PM    < end of copied text >

## 2022-10-17 NOTE — PROGRESS NOTE BEHAVIORAL HEALTH - RISK ASSESSMENT
Risk factors affective dysregulation, cannabis use, interpersonal conflicts, recent SI. Protective factors include strong social support, residential stability, help seeking, cooperative with treatment, denies SI/I/P, future oriented, willingness to utilize crisis services/ED in times for crisis.
Risk factors recent affective dysregulation and SI. Protective factors include strong social support, residential stability, help seeking, cooperative with treatment, sobriety, improved insight, denies SI/I/P, future oriented, willingness to utilize crisis services/ED in times for crisis.
Risk factors affective dysregulation, cannabis use, interpersonal conflicts, recent SI. Protective factors include strong social support, residential stability, help seeking, cooperative with treatment, denies SI/I/P, future oriented, willingness to utilize crisis services/ED in times for crisis.

## 2022-10-17 NOTE — PROGRESS NOTE BEHAVIORAL HEALTH - NSBHADMITDANGERSELF_PSY_A_CORE
suicidal ideation with plan and means
suicidal ideation with plan and means
unable to care for self
unable to care for self
suicidal ideation with plan and means

## 2022-10-17 NOTE — PROGRESS NOTE BEHAVIORAL HEALTH - PRN MEDICATIONS SINCE LAST EVAL
maintain upright posture during/after eating for 30 mins/no straws/alternate food with liquid/position upright (90 degrees)/small sips/bites/oral hygiene
no
no
yes
no

## 2022-10-17 NOTE — BH SAFETY PLAN - LOCAL URGENT CARE ADDRESS
[facility] is located at [address] [place] [address] (inside the [facility]).

## 2022-10-17 NOTE — PROGRESS NOTE BEHAVIORAL HEALTH - PRIMARY DX
Current moderate episode of major depressive disorder, unspecified whether recurrent

## 2022-10-17 NOTE — PROGRESS NOTE BEHAVIORAL HEALTH - ABNORMAL MOVEMENTS
No abnormal movements
General

## 2022-10-17 NOTE — PROGRESS NOTE BEHAVIORAL HEALTH - NSBHFUPINTERVALHXFT_PSY_A_CORE
Pt seen and evaluated, chart reviewed. As per nursing report, no acute events overnight, no PRNs. On evaluation, pt presents well-groomed with bright affect socializing appropriately with unit milieu. He reports that he is doing good and is ready for discharge. Reports improved anxiety and coping skills. States he is sleeping and eating well. No overt psychosis noted. Denies SI/HI, intent and plan. Pt appears future-oriented and goal-directed, agrees to OP f/u. Adherent to medications, denies negative side effects. Discharge for today.

## 2022-10-17 NOTE — PROGRESS NOTE BEHAVIORAL HEALTH - NSBHCHARTREVIEWVS_PSY_A_CORE FT
Vital Signs Last 24 Hrs  T(C): 37.1 (14 Oct 2022 09:40), Max: 37.1 (14 Oct 2022 09:40)  T(F): 98.8 (14 Oct 2022 09:40), Max: 98.8 (14 Oct 2022 09:40)  HR: 89 (14 Oct 2022 09:40) (67 - 89)  BP: 137/84 (14 Oct 2022 09:40) (137/84 - 153/84)  BP(mean): --  RR: 18 (14 Oct 2022 09:40) (16 - 18)  SpO2: --
Vital Signs Last 24 Hrs  T(C): 36.4 (17 Oct 2022 07:00), Max: 36.4 (17 Oct 2022 00:18)  T(F): 97.6 (17 Oct 2022 07:00), Max: 97.6 (17 Oct 2022 00:18)  HR: 91 (17 Oct 2022 07:00) (75 - 91)  BP: 142/80 (17 Oct 2022 07:00) (142/80 - 156/79)  BP(mean): --  RR: 16 (17 Oct 2022 07:00) (16 - 20)  SpO2: --
Vital Signs Last 24 Hrs  T(C): 36.7 (15 Oct 2022 08:35), Max: 36.7 (15 Oct 2022 08:35)  T(F): 98 (15 Oct 2022 08:35), Max: 98 (15 Oct 2022 08:35)  HR: 89 (15 Oct 2022 08:35) (89 - 103)  BP: 132/85 (15 Oct 2022 08:35) (132/85 - 157/72)  BP(mean): --  RR: 18 (15 Oct 2022 08:35) (18 - 20)  SpO2: --
Vital Signs (24 Hrs):  T(C): 36.3 (10-16-22 @ 08:27), Max: 36.7 (10-15-22 @ 15:51)  HR: 82 (10-16-22 @ 08:27) (77 - 82)  BP: 142/76 (10-16-22 @ 08:27) (142/76 - 142/86)  RR: 18 (10-16-22 @ 08:27) (16 - 18)  SpO2: --  Wt(kg): --
Vital Signs Last 24 Hrs  T(C): 36.9 (13 Oct 2022 09:04), Max: 36.9 (13 Oct 2022 02:19)  T(F): 98.4 (13 Oct 2022 09:04), Max: 98.4 (13 Oct 2022 02:19)  HR: 85 (13 Oct 2022 09:04) (71 - 85)  BP: 148/91 (13 Oct 2022 09:04) (134/80 - 148/91)  BP(mean): --  RR: 20 (13 Oct 2022 09:04) (20 - 20)  SpO2: 100% (13 Oct 2022 02:19) (100% - 100%)

## 2022-10-17 NOTE — PROGRESS NOTE BEHAVIORAL HEALTH - SUMMARY
22-year-old male; domiciled with parents; high school student; PPHx of ?ADD, no prior admission, hx of NSSIB by cutting, denies hx of SA, hx of aggression; PMHx of scoliosis; BIBS accompanied by father; psychiatry consulted for SI. Pt reports worsening depressive symptoms and SI in setting of multiple psychosocial stressors. His presentation appears most consistent with MDD at this time with underlying cannabis use.     #MDD  -c/w lexapro 10 mg daily  -c/w trazodone 50 mg qhs prn for insomnia (10/14)  -c/w hydroxyzine 25 mg q6h prn for anxiety/insomnia  -encourage groups/DBT    #Cannabis use d/o  -no treatment protocol indicated at this time    #Agitation  -for agitation not amenable to verbal redirection, may give haldol 5 mg q6h prn and ativan 2 mg q6h prn with escalation to IM if pt is a danger to self or/and others with repeat EKG to ensure QTc <500 ms

## 2022-10-20 DIAGNOSIS — F32.1 MAJOR DEPRESSIVE DISORDER, SINGLE EPISODE, MODERATE: ICD-10-CM

## 2022-10-20 DIAGNOSIS — Z91.14 PATIENT'S OTHER NONCOMPLIANCE WITH MEDICATION REGIMEN: ICD-10-CM

## 2022-10-20 DIAGNOSIS — F90.9 ATTENTION-DEFICIT HYPERACTIVITY DISORDER, UNSPECIFIED TYPE: ICD-10-CM

## 2022-10-20 DIAGNOSIS — Z20.822 CONTACT WITH AND (SUSPECTED) EXPOSURE TO COVID-19: ICD-10-CM

## 2022-10-20 DIAGNOSIS — R45.851 SUICIDAL IDEATIONS: ICD-10-CM

## 2022-10-20 DIAGNOSIS — G47.00 INSOMNIA, UNSPECIFIED: ICD-10-CM

## 2022-10-20 DIAGNOSIS — F41.9 ANXIETY DISORDER, UNSPECIFIED: ICD-10-CM

## 2022-10-20 DIAGNOSIS — F12.20 CANNABIS DEPENDENCE, UNCOMPLICATED: ICD-10-CM

## 2022-11-03 RX ORDER — TRAZODONE HCL 50 MG
1 TABLET ORAL
Qty: 14 | Refills: 0
Start: 2022-11-03 | End: 2022-11-16

## 2022-11-03 RX ORDER — ESCITALOPRAM OXALATE 10 MG/1
1 TABLET, FILM COATED ORAL
Qty: 14 | Refills: 0
Start: 2022-11-03 | End: 2022-11-16

## 2022-11-03 NOTE — CHART NOTE - NSCHARTNOTEFT_GEN_A_CORE
Received call from patient to refill meds. Per patient made intake appointment on 10/21. Was not assigned a psychiatrist yet. Call placed to [facility] [phone]. Confirmed patient made intake appointment but unsure if therapy appointment made. Call placed to therapist Jonatan Pan [phone]. Per therapist appointment was scheduled for Monday 10/31. Patient did not make that appointment. Awaiting patient to call and reschedule. No psychiatrist appointment given at this time. Writer spoke to patient and provided patient with contact info to rescheduled. Writer sent 14 day supply of Lexapro and trazodone to patients pharmacy.
Social Work Admit Note:    Patient is 22 years of age female who was admitted for evaluation due to depression. Patient reports he feels hopeless and helpless and has no motivation to perform activities of daily living. Patient reports feeling this way since age 16. Patient reports difficulty with sleep and appetite and states he doesn't feel like he has the will to live. Patient indicates his family thinks he is lazy and his girlfriend thinks he is sad. Patient is requesting assistance stating he wants to feel better. Patient denies prior psychiatric treatment and reports no current outpatient provider. Patient denies audio visual hallucinations.  Alcohol or other substance use denied.  Patient denies any prior attempt at ending his life states he does not wish to end his life rather he wants to feel better and be motivated to live his life. In the community patient resides in a private residence with his family. Patient is gainfully employed.  Patient has a girlfriend of 2 years who he reports is his primary source of support in addition to his family. Patient admitted to unit voluntary status for treatment safety and observation.      Sexual History – patient identifies as heterosexual. 	  Family relationships and history – patient reports good relations with family members.    Leisure Activity Assessment – spending time with family and friends.       Community Supports – no known attendance in any self- help groups or other organizations.   Employment – patient is employed at this time.   Substance Use Assessment – use of alcohol or other substances denied.     History of suicidality or self- injurious behaviors – denies.      Significant Loses – none identified.    Life Goals – patient verbalized goal of optimal mental health.          will continue to meet with patient 1:1 and with treatment team daily.      Discharge plan is for continued mental health treatment in outpatient setting.      Please refer to Social Work Psychosocial for additional information.
Social Work Discharge Note:    Patient is for discharge today.   He is alert and oriented x3.  Mood is improved.  Anxiety has decreased.  Insight and judgment have improved.  Suicidal / homicidal ideation denied.      Patient will be discharged to his home in [place] with his family.  Plan is for referral to [facility].  He is aware and agreeable to same.      Discharge huddle was held prior to discharge to discuss discharge plan and referral for continued mental health treatment in outpatient setting.  No safety concerns were verbalized at that time.  Patient confirms phone number .    Patient is aware and agreeable to discharge today.